# Patient Record
(demographics unavailable — no encounter records)

---

## 2025-01-16 NOTE — PHYSICAL EXAM
[Alert] : alert [Oriented x 3] : ~L oriented x 3 [Well Nourished] : well nourished [Conjunctiva Non-injected] : conjunctiva non-injected [No Visual Lymphadenopathy] : no visual  lymphadenopathy [No Clubbing] : no clubbing [No Edema] : no edema [No Bromhidrosis] : no bromhidrosis [No Chromhidrosis] : no chromhidrosis [Full Body Skin Exam Performed] : performed [FreeTextEntry3] : General: Alert and oriented, in NAD.  All of the following were examined and were within normal limits, except as noted:   Scalp: Face, including eyelids, nose, lips, ears, oropharynx: Neck: Chest/Back/Abdomen: Bilateral Arms/Hands: Bilateral Legs/Feet: Buttocks, Genitalia, Anus/perineum:  	 Hair, Nails, Oral Mucosa, Eyes:   well healed scar L abdomen brown homogenous macules on body including lower lip at vermilion lentigines  few acneiform papules on back

## 2025-01-16 NOTE — ASSESSMENT
[FreeTextEntry1] : #Eczema - chronic; stable - Diagnosis, chronic nature, disease course, treatment options and goals of therapy discussed - Dry skin care: Cerave cream TID - c/w triamcinolone 0.1% ointment BID to affected areas for upto 2 weeks on/1 week OFF cycles.  Strong topical steroids should generally not be used on the face, in armpits, or in other skin folds, unless specifically directed otherwise. Overuse of steroids can lead to thinning of the skin, appearance of red blood vessels, or discoloration of the skin. Use only as directed.   #Multiple benign nevi #Solar lentigo #Screening exam for skin cancer - no suspicious lesions on exam today - TBSE performed today - Advised sun protection. Recommended OTC sunscreen products (EltaMD/Neutrogena/La Roche Posay), including SPF30+ with broadband UV protection as well as proper use. Discussed OTC sun protective clothing - Counseled patient to monitor for changes, including mole monitoring and self-skin exams  #Acne, mild upper back - chronic; flaring - Diagnosis, chronic nature, disease course, treatment options and goals of therapy discussed - Start Cerave SA cleanser in shower - Start clindamycin lotion to AA once daily

## 2025-01-16 NOTE — HISTORY OF PRESENT ILLNESS
[FreeTextEntry1] : spots [de-identified] : YAO CANCHOLA is a 22 year old F who present for f/u as below.   #FBSE.  Spots scattered on body x years. Asymptomatic and unchanged. No alleviating/aggravating factors. Never been treated.    Derm hx: Mild to moderate dysplastic nevus with clear margins on biopsy on L lower abdomen (7/2022) Personal hx of skin cancer: no FHx of skin cancer: mom with skin cancer  Social hx: works at [employer]

## 2025-01-16 NOTE — HISTORY OF PRESENT ILLNESS
[FreeTextEntry1] : spots [de-identified] : YAO CANCHOLA is a 22 year old F who present for f/u as below.   #FBSE.  Spots scattered on body x years. Asymptomatic and unchanged. No alleviating/aggravating factors. Never been treated.    Derm hx: Mild to moderate dysplastic nevus with clear margins on biopsy on L lower abdomen (7/2022) Personal hx of skin cancer: no FHx of skin cancer: mom with skin cancer  Social hx: works at [employer]

## 2025-02-19 NOTE — DISCUSSION/SUMMARY
[FreeTextEntry1] : 1) pap/std cultures performed 2) consistent condom usage advised  Return to office in one year, sooner prn

## 2025-02-19 NOTE — HISTORY OF PRESENT ILLNESS
[unknown] : Patient is unsure of the date of her LMP [Currently Active] : currently active [Men] : men [No] : No [TextBox_4] : Skip is a 21 y/o G0 who presents today for an annual exam.  She has had the Kyleena IUD since 12/19/22. She is s/p Gardasil vaccination  She graduated from [employer] witha  degree in [employer].  she is now working at [employer] in the city. Her college boyfriend is applying to dental school they are in a long distance relationship [FreeTextEntry3] : IUD

## 2025-07-23 NOTE — ASSESSMENT
[FreeTextEntry1] : healthy female stressed diet and exercise  [Vaccines Reviewed] : Immunizations reviewed today. Please see immunization details in the vaccine log within the immunization flowsheet.

## 2025-07-23 NOTE — PHYSICAL EXAM
[No Acute Distress] : no acute distress [No Respiratory Distress] : no respiratory distress  [Normal Rate] : normal rate  [Soft] : abdomen soft [No CVA Tenderness] : no CVA  tenderness [No Joint Swelling] : no joint swelling

## 2025-07-23 NOTE — HEALTH RISK ASSESSMENT
[Good] : ~his/her~  mood as  good [Yes] : Yes [No falls in past year] : Patient reported no falls in the past year [0] : 2) Feeling down, depressed, or hopeless: Not at all (0) [MQH2Iumby] : 0 [Patient reported PAP Smear was normal] : Patient reported PAP Smear was normal [PapSmearDate] : 02/25